# Patient Record
Sex: MALE | Race: BLACK OR AFRICAN AMERICAN | Employment: UNEMPLOYED | URBAN - METROPOLITAN AREA
[De-identification: names, ages, dates, MRNs, and addresses within clinical notes are randomized per-mention and may not be internally consistent; named-entity substitution may affect disease eponyms.]

---

## 2020-08-17 VITALS
SYSTOLIC BLOOD PRESSURE: 110 MMHG | BODY MASS INDEX: 24.99 KG/M2 | HEIGHT: 65 IN | WEIGHT: 150 LBS | DIASTOLIC BLOOD PRESSURE: 76 MMHG

## 2020-08-17 PROBLEM — R42 DIZZINESS: Status: ACTIVE | Noted: 2020-08-17

## 2020-08-17 RX ORDER — MECLIZINE HYDROCHLORIDE 25 MG/1
TABLET ORAL
COMMUNITY
End: 2021-04-05 | Stop reason: ALTCHOICE

## 2020-08-17 RX ORDER — ONDANSETRON 4 MG/1
4 TABLET, ORALLY DISINTEGRATING ORAL
COMMUNITY

## 2020-08-17 RX ORDER — CLOTRIMAZOLE 10 MG/1
10 LOZENGE ORAL; TOPICAL
COMMUNITY

## 2020-08-18 ENCOUNTER — OFFICE VISIT (OUTPATIENT)
Dept: PRIMARY CARE CLINIC | Age: 50
End: 2020-08-18
Payer: MEDICAID

## 2020-08-18 VITALS
BODY MASS INDEX: 25.44 KG/M2 | RESPIRATION RATE: 18 BRPM | HEART RATE: 70 BPM | OXYGEN SATURATION: 100 % | TEMPERATURE: 98.7 F | SYSTOLIC BLOOD PRESSURE: 116 MMHG | DIASTOLIC BLOOD PRESSURE: 68 MMHG | HEIGHT: 64 IN | WEIGHT: 149 LBS

## 2020-08-18 DIAGNOSIS — R42 DIZZINESS: Primary | ICD-10-CM

## 2020-08-18 PROCEDURE — 99213 OFFICE O/P EST LOW 20 MIN: CPT | Performed by: NURSE PRACTITIONER

## 2020-08-18 NOTE — LETTER
NOTIFICATION RETURN TO WORK / SCHOOL 
 
8/18/2020 11:52 AM 
 
Mr. Lloyd Barroso [address] [place] [id number] To Whom It May Concern: 
 
Lloyd Elkins is currently under the care of [address] [place] [address]. He will return to work/school on: 8/24/20 If there are questions or concerns please have the patient contact our office. Sincerely, Perla Davies NP

## 2020-08-20 ENCOUNTER — TELEPHONE (OUTPATIENT)
Dept: PRIMARY CARE CLINIC | Age: 50
End: 2020-08-20

## 2020-08-21 LAB
ALBUMIN SERPL-MCNC: 4.6 G/DL (ref 4–5)
ALBUMIN/GLOB SERPL: 1.8 {RATIO} (ref 1.2–2.2)
ALP SERPL-CCNC: 60 IU/L (ref 39–117)
ALT SERPL-CCNC: 17 IU/L (ref 0–44)
APPEARANCE UR: CLEAR
AST SERPL-CCNC: 17 IU/L (ref 0–40)
BASOPHILS # BLD AUTO: 0.1 X10E3/UL (ref 0–0.2)
BASOPHILS NFR BLD AUTO: 1 %
BILIRUB SERPL-MCNC: 0.3 MG/DL (ref 0–1.2)
BILIRUB UR QL STRIP: NEGATIVE
BUN SERPL-MCNC: 14 MG/DL (ref 6–24)
BUN/CREAT SERPL: 12 (ref 9–20)
CALCIUM SERPL-MCNC: 9.8 MG/DL (ref 8.7–10.2)
CHLORIDE SERPL-SCNC: 101 MMOL/L (ref 96–106)
CO2 SERPL-SCNC: 26 MMOL/L (ref 20–29)
COLOR UR: YELLOW
CREAT SERPL-MCNC: 1.19 MG/DL (ref 0.76–1.27)
EOSINOPHIL # BLD AUTO: 0.3 X10E3/UL (ref 0–0.4)
EOSINOPHIL NFR BLD AUTO: 6 %
ERYTHROCYTE [DISTWIDTH] IN BLOOD BY AUTOMATED COUNT: 12.7 % (ref 11.6–15.4)
GLOBULIN SER CALC-MCNC: 2.5 G/DL (ref 1.5–4.5)
GLUCOSE SERPL-MCNC: 94 MG/DL (ref 65–99)
GLUCOSE UR QL: NEGATIVE
HCT VFR BLD AUTO: 43.8 % (ref 37.5–51)
HGB BLD-MCNC: 13.8 G/DL (ref 13–17.7)
HGB UR QL STRIP: NEGATIVE
IMM GRANULOCYTES # BLD AUTO: 0 X10E3/UL (ref 0–0.1)
IMM GRANULOCYTES NFR BLD AUTO: 0 %
KETONES UR QL STRIP: NEGATIVE
LEUKOCYTE ESTERASE UR QL STRIP: NEGATIVE
LYMPHOCYTES # BLD AUTO: 2 X10E3/UL (ref 0.7–3.1)
LYMPHOCYTES NFR BLD AUTO: 40 %
MCH RBC QN AUTO: 28.6 PG (ref 26.6–33)
MCHC RBC AUTO-ENTMCNC: 31.5 G/DL (ref 31.5–35.7)
MCV RBC AUTO: 91 FL (ref 79–97)
MICRO URNS: NORMAL
MONOCYTES # BLD AUTO: 0.5 X10E3/UL (ref 0.1–0.9)
MONOCYTES NFR BLD AUTO: 10 %
NEUTROPHILS # BLD AUTO: 2.1 X10E3/UL (ref 1.4–7)
NEUTROPHILS NFR BLD AUTO: 43 %
NITRITE UR QL STRIP: NEGATIVE
PH UR STRIP: 5.5 [PH] (ref 5–7.5)
PLATELET # BLD AUTO: 287 X10E3/UL (ref 150–450)
POTASSIUM SERPL-SCNC: 4 MMOL/L (ref 3.5–5.2)
PROT SERPL-MCNC: 7.1 G/DL (ref 6–8.5)
PROT UR QL STRIP: NEGATIVE
RBC # BLD AUTO: 4.82 X10E6/UL (ref 4.14–5.8)
SODIUM SERPL-SCNC: 142 MMOL/L (ref 134–144)
SP GR UR: 1.03 (ref 1–1.03)
TSH SERPL DL<=0.005 MIU/L-ACNC: 0.61 UIU/ML (ref 0.45–4.5)
UROBILINOGEN UR STRIP-MCNC: 0.2 MG/DL (ref 0.2–1)
WBC # BLD AUTO: 5 X10E3/UL (ref 3.4–10.8)

## 2020-08-21 NOTE — PROGRESS NOTES
Please notify patient that all labs are essentially normal.  Please contact with any questions or concerns

## 2020-08-21 NOTE — PROGRESS NOTES
Lloyd Elkins is a 52 y.o. male who presents to the office today for the following:    Chief Complaint   Patient presents with    Dizziness       Past Medical History:   Diagnosis Date    Dizziness 8/17/2020       No past surgical history on file. Family History   Problem Relation Age of Onset    Heart Disease Mother         Social History     Tobacco Use    Smoking status: Former Smoker    Smokeless tobacco: Current User   Substance Use Topics    Alcohol use: Not on file    Drug use: Not on file        HPI  Patient here with c/o continued dizziness. States that he thought symptoms were better temporarily but now are daily again and affecting him at work. Was taking the meclizine and doing recommended head exercises which helped initially but now do not. Feels off balance with walking at times and especially if he is doing a lot of activity that requires him to move his head a lot. Was referred to ENT due to symptoms thought to be vertigo but states he did not go due to staff at referral office he thought were \"rude. \" Does feel nauseated some when symptoms occurring and sometimes has headache. Denies chest pain, palpitations, sob, numbness or weakness in extremities. Current Outpatient Medications on File Prior to Visit   Medication Sig    meclizine (ANTIVERT) 25 mg tablet Take  by mouth three (3) times daily as needed for Dizziness.  clotrimazole (MYCELEX) 10 mg jose cruz Take 10 mg by mouth five (5) times daily.  ondansetron (ZOFRAN ODT) 4 mg disintegrating tablet Take 4 mg by mouth every eight (8) hours as needed for Nausea or Vomiting. No current facility-administered medications on file prior to visit. Review of Systems   Constitutional: Negative. HENT: Negative. Eyes: Negative. Negative for blurred vision, double vision, photophobia, pain, discharge and redness. Respiratory: Negative.   Negative for cough, hemoptysis, sputum production, shortness of breath and wheezing. Cardiovascular: Negative. Negative for chest pain, palpitations, orthopnea, claudication, leg swelling and PND. Gastrointestinal: Negative. Negative for abdominal pain, blood in stool, constipation, diarrhea, heartburn, nausea and vomiting. Genitourinary: Negative. Negative for dysuria, flank pain, frequency, hematuria and urgency. Musculoskeletal: Negative. Negative for back pain, falls, joint pain, myalgias and neck pain. Skin: Negative. Neurological: Positive for dizziness and headaches. Negative for tingling, tremors, sensory change, speech change, focal weakness, seizures, loss of consciousness and weakness. Visit Vitals  /68 (BP 1 Location: Left arm, BP Patient Position: Sitting)   Pulse 70   Temp 98.7 °F (37.1 °C) (Tympanic)   Resp 18   Ht 5' 4\" (1.626 m)   Wt 149 lb (67.6 kg)   SpO2 100%   BMI 25.58 kg/m²       Physical Exam  Vitals signs and nursing note reviewed. Constitutional:       Appearance: Normal appearance. HENT:      Head: Normocephalic and atraumatic. Nose: Nose normal.      Mouth/Throat:      Mouth: Mucous membranes are moist.      Pharynx: Oropharynx is clear. Eyes:      Pupils: Pupils are equal, round, and reactive to light. Cardiovascular:      Rate and Rhythm: Normal rate and regular rhythm. Heart sounds: No murmur. Pulmonary:      Effort: Pulmonary effort is normal.      Breath sounds: Normal breath sounds. Abdominal:      General: Abdomen is flat. Bowel sounds are normal.      Palpations: Abdomen is soft. Musculoskeletal: Normal range of motion. Skin:     General: Skin is warm and dry. Neurological:      Mental Status: He is alert and oriented to person, place, and time. Mental status is at baseline. Cranial Nerves: No cranial nerve deficit. Sensory: No sensory deficit. Motor: No weakness.       Coordination: Coordination normal.      Gait: Gait normal.      Deep Tendon Reflexes: Reflexes normal. Psychiatric:         Mood and Affect: Mood normal.         Behavior: Behavior normal.         Judgment: Judgment normal.          1. Dizziness  Symptoms still appear more consistent with vertigo but has not attended appointment with ENT which I have encouraged him to do. Orthostatics have been normal when checked and no chest pain, palpitations etc reported. Will check labs and CT of head to rule out other causes given no improvement in symptoms and duration. May continue the meclizine prn  Avoid sudden changes in head movements (ie bending head down between knees, etc) and get up slowly  Discuss further recommendations pending results from further testing  - CBC WITH AUTOMATED DIFF  - METABOLIC PANEL, COMPREHENSIVE  - TSH 3RD GENERATION  - URINALYSIS W/ RFLX MICROSCOPIC  - CT HEAD WO CONT; Future      Patient verbalizes understanding of plan of care as discussed above    Follow-up and Dispositions    · Return if symptoms worsen or fail to improve.

## 2020-08-27 ENCOUNTER — DOCUMENTATION ONLY (OUTPATIENT)
Dept: PRIMARY CARE CLINIC | Age: 50
End: 2020-08-27

## 2020-08-27 NOTE — PROGRESS NOTES
Called pt to let him know that his insurance denied the CT scan but he does have an appt with dr Ynes Tee the end of this month to be seen for the vertigo

## 2020-09-14 ENCOUNTER — TELEPHONE (OUTPATIENT)
Dept: ENT CLINIC | Age: 50
End: 2020-09-14

## 2020-09-14 NOTE — TELEPHONE ENCOUNTER
I contacted all pt's after 3:00pm to see if they would switch they were not available to.  I left pt a vm that he needed to reschedule appt for tomorrow if he cannot make it at 2:00pm

## 2020-12-08 ENCOUNTER — TELEPHONE (OUTPATIENT)
Dept: ENT CLINIC | Age: 50
End: 2020-12-08

## 2020-12-09 ENCOUNTER — OFFICE VISIT (OUTPATIENT)
Dept: ENT CLINIC | Age: 50
End: 2020-12-09
Payer: MEDICAID

## 2020-12-09 VITALS
HEART RATE: 91 BPM | OXYGEN SATURATION: 99 % | BODY MASS INDEX: 25.44 KG/M2 | HEIGHT: 64 IN | WEIGHT: 149 LBS | DIASTOLIC BLOOD PRESSURE: 76 MMHG | SYSTOLIC BLOOD PRESSURE: 120 MMHG | TEMPERATURE: 96.9 F | RESPIRATION RATE: 18 BRPM

## 2020-12-09 DIAGNOSIS — J34.3 HYPERTROPHY OF BOTH INFERIOR NASAL TURBINATES: ICD-10-CM

## 2020-12-09 DIAGNOSIS — G47.30 SLEEP DISORDER BREATHING: ICD-10-CM

## 2020-12-09 DIAGNOSIS — R42 DIZZINESS: Primary | ICD-10-CM

## 2020-12-09 DIAGNOSIS — J31.0 CHRONIC RHINITIS: ICD-10-CM

## 2020-12-09 PROCEDURE — 99204 OFFICE O/P NEW MOD 45 MIN: CPT | Performed by: OTOLARYNGOLOGY

## 2020-12-09 RX ORDER — CETIRIZINE HCL 10 MG
10 TABLET ORAL DAILY
Qty: 30 TAB | Refills: 3 | Status: SHIPPED | OUTPATIENT
Start: 2020-12-09

## 2020-12-09 RX ORDER — FLUTICASONE PROPIONATE 50 MCG
2 SPRAY, SUSPENSION (ML) NASAL DAILY
Qty: 1 BOTTLE | Refills: 3 | Status: SHIPPED | OUTPATIENT
Start: 2020-12-09

## 2020-12-09 NOTE — LETTER
NOTIFICATION RETURN TO WORK / SCHOOL 
 
12/9/2020 10:49 AM 
 
[facility] [address] [place], [place] [id number] To Whom It May Concern: 
 
Shreya Bond is currently under the care of [facility]. He was seen in our office on: 12/09/2020 He will return to work/school on:12/10/2020 If there are questions or concerns please have the patient contact our office. Sincerely, Grace Alexander MD

## 2020-12-09 NOTE — LETTER
12/9/20 Patient: Jim Rios YOB: 1970 Date of Visit: 12/9/2020 Kailey Wetzel NP 
[address] [facility],[address] [address] VIA In Basket Dear Kailey Wetzel NP, Thank you for referring Mr. Melida Forrester to [facility] for evaluation. My notes for this consultation are attached. If you have questions, please do not hesitate to call me. I look forward to following your patient along with you. Sincerely, Diaz Cristobal MD

## 2020-12-09 NOTE — PROGRESS NOTES
Subjective:    Esteban Medel   48 y.o.   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 HENT: Positive for congestion. Negative for ear pain, hearing loss, nosebleeds and tinnitus. Eyes: Negative for blurred vision and double vision. Respiratory: Negative for cough, sputum production and shortness of breath. Cardiovascular: Negative for chest pain and palpitations. Gastrointestinal: Negative for heartburn, nausea and vomiting. Musculoskeletal: Negative for joint pain and neck pain. Skin: Negative. Neurological: Positive for dizziness. Negative for speech change, weakness and headaches. Endo/Heme/Allergies: Negative for environmental allergies. Does not bruise/bleed easily. Psychiatric/Behavioral: Negative for memory loss. The patient does not have insomnia. Past Medical History:   Diagnosis Date    Dizziness 8/17/2020     History reviewed. No pertinent surgical history. Family History   Problem Relation Age of Onset    Heart Disease Mother      Social History     Tobacco Use    Smoking status: Former Smoker    Smokeless tobacco: Current User   Substance Use Topics    Alcohol use: Not on file      Prior to Admission medications    Medication Sig Start Date End Date Taking? Authorizing Provider   cetirizine (ZYRTEC) 10 mg tablet Take 1 Tab by mouth daily. 12/9/20  Yes Uriel Kovacs MD   fluticasone propionate (FLONASE) 50 mcg/actuation nasal spray 2 Sprays by Both Nostrils route daily. 12/9/20  Yes Paola Ambrosio MD   clotrimazole (MYCELEX) 10 mg jose cruz Take 10 mg by mouth five (5) times daily. Provider, Historical   meclizine (ANTIVERT) 25 mg tablet Take  by mouth three (3) times daily as needed for Dizziness. Provider, Historical   ondansetron (ZOFRAN ODT) 4 mg disintegrating tablet Take 4 mg by mouth every eight (8) hours as needed for Nausea or Vomiting. Provider, Historical        No Known Allergies      Objective:     Visit Vitals  /76 (BP 1 Location: Left arm, BP Patient Position: Sitting)   Pulse 91   Temp 96.9 °F (36.1 °C) (Oral)   Resp 18   Ht 5' 4\" (1.626 m)   Wt 149 lb (67.6 kg)   SpO2 99%   BMI 25.58 kg/m²        Physical Exam  Vitals signs reviewed. Constitutional:       General: He is awake. Appearance: Normal appearance. He is normal weight. HENT:      Head: Normocephalic and atraumatic. Jaw: There is normal jaw occlusion. No trismus, tenderness or malocclusion. Salivary Glands: Right salivary gland is not diffusely enlarged or tender. Left salivary gland is not diffusely enlarged or tender. Right Ear: Hearing, tympanic membrane, ear canal and external ear normal.      Left Ear: Hearing, tympanic membrane, ear canal and external ear normal.      Ears:      Andrew exam findings: does not lateralize. Right Rinne: AC > BC. Left Rinne: AC > BC. Nose: Mucosal edema present. No septal deviation or rhinorrhea. Right Turbinates: Enlarged. Not swollen or pale. Left Turbinates: Enlarged. Not swollen or pale. Right Sinus: No maxillary sinus tenderness or frontal sinus tenderness. Left Sinus: No maxillary sinus tenderness or frontal sinus tenderness. Mouth/Throat:      Lips: Pink. Mouth: Mucous membranes are moist. No oral lesions. Dentition: Normal dentition. No gum lesions. Tongue: No lesions. Palate: No mass and lesions. Pharynx: Oropharynx is clear. Uvula midline. Tonsils: No tonsillar exudate. 2+ on the right. 2+ on the left. Eyes:      General: Vision grossly intact. Extraocular Movements: Extraocular movements intact. Right eye: No nystagmus. Left eye: No nystagmus. Pupils: Pupils are equal, round, and reactive to light. Neck:      Musculoskeletal: Normal range of motion. No edema or erythema. Thyroid: No thyroid mass, thyromegaly or thyroid tenderness. Trachea: Trachea and phonation normal. No tracheal tenderness. Cardiovascular:      Rate and Rhythm: Normal rate and regular rhythm. Pulmonary:      Effort: Pulmonary effort is normal.      Breath sounds: Normal breath sounds. No stridor. No wheezing. Musculoskeletal: Normal range of motion. Lymphadenopathy:      Cervical: No cervical adenopathy. Skin:     General: Skin is warm and dry. Neurological:      General: No focal deficit present. Mental Status: He is alert and oriented to person, place, and time. Mental status is at baseline. Cranial Nerves: Cranial nerves are intact. Coordination: Romberg sign negative. Gait: Gait is intact. Comments: Negative Hallpike   Psychiatric:         Mood and Affect: Mood normal.         Behavior: Behavior normal. Behavior is cooperative. Assessment/Plan:     Encounter Diagnoses   Name Primary?  Dizziness Yes    Chronic rhinitis     Hypertrophy of both inferior nasal turbinates     Sleep disorder breathing      He does not have sx suggestive of a vestibular disorder  He does have findings c/w possible SUKH and allergies.   Will get sleep study referral and start daily antihistamine and flonase  Fu 3 mos    Orders Placed This Encounter   [address] [place]    cetirizine (ZYRTEC) 10 mg tablet    fluticasone propionate (FLONASE) 50 mcg/actuation nasal spray     Follow-up and Dispositions    · Return in about 3 months (around 3/9/2021).

## 2021-01-20 ENCOUNTER — TELEPHONE (OUTPATIENT)
Dept: ENT CLINIC | Age: 51
End: 2021-01-20

## 2021-04-05 ENCOUNTER — OFFICE VISIT (OUTPATIENT)
Dept: PRIMARY CARE CLINIC | Age: 51
End: 2021-04-05
Payer: MEDICAID

## 2021-04-05 VITALS
SYSTOLIC BLOOD PRESSURE: 108 MMHG | HEIGHT: 64 IN | HEART RATE: 86 BPM | BODY MASS INDEX: 25.12 KG/M2 | WEIGHT: 147.13 LBS | RESPIRATION RATE: 18 BRPM | DIASTOLIC BLOOD PRESSURE: 70 MMHG | TEMPERATURE: 97.9 F | OXYGEN SATURATION: 100 %

## 2021-04-05 DIAGNOSIS — M79.609 PAIN DUE TO ONYCHOMYCOSIS OF NAIL: ICD-10-CM

## 2021-04-05 DIAGNOSIS — B35.1 PAIN DUE TO ONYCHOMYCOSIS OF NAIL: ICD-10-CM

## 2021-04-05 DIAGNOSIS — Z12.11 SCREENING FOR MALIGNANT NEOPLASM OF COLON: Primary | ICD-10-CM

## 2021-04-05 DIAGNOSIS — F17.200 TOBACCO DEPENDENCE: ICD-10-CM

## 2021-04-05 DIAGNOSIS — R53.83 FATIGUE, UNSPECIFIED TYPE: ICD-10-CM

## 2021-04-05 DIAGNOSIS — R06.83 HABITUAL SNORING: ICD-10-CM

## 2021-04-05 PROCEDURE — 99214 OFFICE O/P EST MOD 30 MIN: CPT | Performed by: NURSE PRACTITIONER

## 2021-04-05 NOTE — PROGRESS NOTES
Saqib Frias is a 48 y.o. male who presents to the office today for the following:    Chief Complaint   Patient presents with    Sleep Problem     Patient states he has a problem just falling asleep or sleeping so hard that he doesn't hear his phone or alarm. He is \"just exhausted\"    Referral Follow Up     States he needs a F/U referral to see Dr. Damian Suero to have a sleep study done.  Nail Problem       Past Medical History:   Diagnosis Date    Dizziness 8/17/2020    Tobacco dependence 4/5/2021       History reviewed. No pertinent surgical history. Family History   Problem Relation Age of Onset    Heart Disease Mother         Social History     Tobacco Use    Smoking status: Current Every Day Smoker     Packs/day: 0.75     Years: 1.00     Pack years: 0.75    Smokeless tobacco: Never Used   Substance Use Topics    Alcohol use: Not Currently    Drug use: Yes     Types: Marijuana     Comment: Uses this occassionally. HPI  Patient here with PMH of seasonal allergies, vertigo and tobacco dependence who presents with complaints of feeling tired \"all the time. \" States that he recently lost his job due to being late multiple times resulting from oversleeping. Reports that this has been worse since November 2020. Does state that during that time he was being required to rotate shifts which he thinks was a big cause of the problem. He has been out of work however for past few weeks and still feels that this is problematic despite stopping the rotating shifts. Told by the ENT who he was seeing due to vertigo and allergies that he should get a sleep study. Thinks they put a referral but he did not hear from anyone. Has been told in past by others that he snores loudly and has woken himself up snoring before. Also here due to he wants to see a podiatrist about nails as some are discolored and the nails on both of his small toes stay sore.      Current Outpatient Medications on File Prior to Visit Medication Sig    cetirizine (ZYRTEC) 10 mg tablet Take 1 Tab by mouth daily.  fluticasone propionate (FLONASE) 50 mcg/actuation nasal spray 2 Sprays by Both Nostrils route daily.  clotrimazole (MYCELEX) 10 mg jose cruz Take 10 mg by mouth five (5) times daily.  ondansetron (ZOFRAN ODT) 4 mg disintegrating tablet Take 4 mg by mouth every eight (8) hours as needed for Nausea or Vomiting.  [DISCONTINUED] meclizine (ANTIVERT) 25 mg tablet Take  by mouth three (3) times daily as needed for Dizziness. No current facility-administered medications on file prior to visit. No orders of the defined types were placed in this encounter. Review of Systems   Constitutional: Positive for malaise/fatigue. Negative for chills, diaphoresis, fever and weight loss. HENT: Negative for congestion, ear discharge, hearing loss, nosebleeds, sinus pain, sore throat and tinnitus. Snoring   Eyes: Negative. Negative for blurred vision, double vision, photophobia, pain, discharge and redness. Respiratory: Negative. Negative for cough, hemoptysis, sputum production, shortness of breath, wheezing and stridor. Cardiovascular: Negative. Negative for chest pain, palpitations, orthopnea, claudication, leg swelling and PND. Gastrointestinal: Negative. Negative for abdominal pain, blood in stool, constipation, diarrhea, heartburn, nausea and vomiting. Genitourinary: Negative. Negative for dysuria, flank pain, frequency, hematuria and urgency. Musculoskeletal: Negative. Negative for back pain, falls, joint pain, myalgias and neck pain. Skin: Negative for itching and rash. Nails discolored bilaterally   Neurological: Negative for dizziness, tingling, tremors, sensory change, speech change, focal weakness, seizures, loss of consciousness, weakness and headaches. Psychiatric/Behavioral: Negative.             Visit Vitals  /70 (BP 1 Location: Right upper arm, BP Patient Position: Sitting, BP Cuff Size: Adult)   Pulse 86   Temp 97.9 °F (36.6 °C) (Skin)   Resp 18   Ht 5' 4\" (1.626 m)   Wt 147 lb 2 oz (66.7 kg)   SpO2 100%   BMI 25.25 kg/m²       Physical Exam  Vitals signs and nursing note reviewed. Constitutional:       Appearance: Normal appearance. HENT:      Head: Normocephalic and atraumatic. Right Ear: Tympanic membrane normal.      Left Ear: Tympanic membrane normal.      Nose: No congestion. Mouth/Throat:      Mouth: Mucous membranes are moist.      Pharynx: Oropharynx is clear. Eyes:      Pupils: Pupils are equal, round, and reactive to light. Neck:      Vascular: No carotid bruit. Cardiovascular:      Rate and Rhythm: Normal rate and regular rhythm. Pulses: Normal pulses. Dorsalis pedis pulses are 2+ on the right side and 2+ on the left side. Posterior tibial pulses are 2+ on the right side and 2+ on the left side. Heart sounds: Normal heart sounds. Pulmonary:      Effort: Pulmonary effort is normal.      Breath sounds: Normal breath sounds. Abdominal:      General: Bowel sounds are normal.      Palpations: Abdomen is soft. Tenderness: There is no abdominal tenderness. Musculoskeletal: Normal range of motion. Right lower leg: No edema. Left lower leg: No edema. Right foot: Normal range of motion. Left foot: Normal range of motion. Feet:      Right foot:      Skin integrity: Dry skin present. No ulcer, blister or warmth. Toenail Condition: Right toenails are abnormally thick. Fungal disease present. Left foot:      Skin integrity: Dry skin present. No ulcer, blister or warmth. Toenail Condition: Left toenails are abnormally thick. Fungal disease present. Lymphadenopathy:      Cervical: No cervical adenopathy. Skin:     General: Skin is warm and dry. Neurological:      Mental Status: He is alert and oriented to person, place, and time. Mental status is at baseline.           1. Habitual snoring  Reports daytime fatigue, habitual snoring and gasping during sleep. Check home sleep study and follow up pending results  - SLEEP MEDICINE REFERRAL    2. Fatigue, unspecified type  Checking sleep study  Had labs done that including TSH in 8/2020 which were normal  He declines repeating even with change since labs    3. Screening for malignant neoplasm of colon  Recently turned age 48 and discussed recommended colonoscopy which he is agreeable to  - COLONOSCOPY; Future  - REFERRAL TO GASTROENTEROLOGY    4. Pain due to onychomycosis of nail  Refer to podiatry for further eval and treatment  - REFERRAL TO PODIATRY    5. Tobacco dependence  Continue to encourage smoking cessation      Patient verbalizes understanding of plan of care as discussed above    Follow-up and Dispositions    · Return in about 4 weeks (around 5/3/2021) for or sooner for worsening symptoms.

## 2021-04-05 NOTE — PROGRESS NOTES
Chief Complaint   Patient presents with    Sleep Problem     Patient states he has a problem just falling asleep or sleeping so hard that he doesn't hear his phone or alarm. He is \"just exhausted\"    Referral Follow Up     States he needs a F/U referral to see Dr. Fermin Paz to have a sleep study done. 1. Have you been to the ER, urgent care clinic since your last visit? Hospitalized since your last visit? No    2. Have you seen or consulted any other health care providers outside of the [address] [place] [address] since your last visit? Include any pap smears or colon screening.  No

## 2021-04-07 ENCOUNTER — TELEPHONE (OUTPATIENT)
Dept: GASTROENTEROLOGY | Age: 51
End: 2021-04-07

## 2021-04-07 RX ORDER — POLYETHYLENE GLYCOL 3350 17 G/17G
POWDER, FOR SOLUTION ORAL
Qty: 510 G | Refills: 0 | Status: SHIPPED | OUTPATIENT
Start: 2021-04-07

## 2021-04-07 NOTE — TELEPHONE ENCOUNTER
I called patient, we discussed scheduling the colonoscopy screening. We reviewed the questionair, he is ok to schedule. He chose 4-. Orders and instructions mailed to patient. Silas Baird notified. No PA required per Margaret Cruz CC-Ref # [id number].

## 2021-04-08 ENCOUNTER — TELEPHONE (OUTPATIENT)
Dept: GASTROENTEROLOGY | Age: 51
End: 2021-04-08

## 2021-04-08 NOTE — LETTER
2021 9:02 AM 
 
[facility] [address] [place] [id number] Dear Ming Arredondo NP Thank you for referring your patient Sergo Guerrier , : 1970, to us for colonoscopy screening. In contacting the patient, he has been scheduled for 2021. Sincerely, Riley Mancia MD

## 2021-04-19 ENCOUNTER — HOSPITAL ENCOUNTER (OUTPATIENT)
Dept: PREADMISSION TESTING | Age: 51
Discharge: HOME | End: 2021-04-19
Payer: MEDICAID

## 2021-04-19 LAB — SARS-COV-2, COV2: NORMAL

## 2021-04-19 PROCEDURE — U0005 INFEC AGEN DETEC AMPLI PROBE: HCPCS

## 2021-04-20 LAB — SARS-COV-2, COV2NT: NOT DETECTED

## 2021-04-22 ENCOUNTER — TELEPHONE (OUTPATIENT)
Dept: GASTROENTEROLOGY | Age: 51
End: 2021-04-22

## 2021-07-12 ENCOUNTER — TELEPHONE (OUTPATIENT)
Dept: PRIMARY CARE CLINIC | Age: 51
End: 2021-07-12

## 2021-07-12 NOTE — TELEPHONE ENCOUNTER
Patient was calling and said the place where he did his sleep study were sending the results to Brock Turcios. He was calling to get the results. Patient would like a call back.

## 2021-07-29 ENCOUNTER — OFFICE VISIT (OUTPATIENT)
Dept: PRIMARY CARE CLINIC | Age: 51
End: 2021-07-29
Payer: MEDICAID

## 2021-07-29 VITALS
OXYGEN SATURATION: 99 % | DIASTOLIC BLOOD PRESSURE: 71 MMHG | WEIGHT: 150.6 LBS | SYSTOLIC BLOOD PRESSURE: 132 MMHG | RESPIRATION RATE: 18 BRPM | BODY MASS INDEX: 25.85 KG/M2 | HEART RATE: 108 BPM | TEMPERATURE: 98.8 F

## 2021-07-29 DIAGNOSIS — R10.32 LLQ ABDOMINAL PAIN: Primary | ICD-10-CM

## 2021-07-29 PROCEDURE — 99213 OFFICE O/P EST LOW 20 MIN: CPT | Performed by: NURSE PRACTITIONER

## 2021-07-29 NOTE — PROGRESS NOTES
Kenia Russell is a 48 y.o. male who presents to the office today for the following:    Chief Complaint   Patient presents with    Abdominal Pain       Past Medical History:   Diagnosis Date    Dizziness 8/17/2020    Tobacco dependence 4/5/2021       History reviewed. No pertinent surgical history. Family History   Problem Relation Age of Onset    Heart Disease Mother         Social History     Tobacco Use    Smoking status: Current Every Day Smoker     Packs/day: 0.75     Years: 1.00     Pack years: 0.75    Smokeless tobacco: Never Used   Vaping Use    Vaping Use: Former   Substance Use Topics    Alcohol use: Not Currently    Drug use: Yes     Types: Marijuana     Comment: Uses this occassionally. HPI  Patient here today with PMH of tobacco dependence, Vertigo and chronic rhinitis who complains of having LLQ abdominal pain 6 days ago and then again 2 days ago. States that his diet has been poor recently and eating more frozen meals. Today has not had any pain. Reports normal bowel movements with no diarrhea or constipation. Did have some nausea the first time pain occurred but none since then. Denies fever or vomiting. Otherwise has been feeling well. Current Outpatient Medications on File Prior to Visit   Medication Sig    polyethylene glycol (MIRALAX) 17 gram/dose powder Use as instructed by physician for colonoscopy prep (Patient not taking: Reported on 7/29/2021)    cetirizine (ZYRTEC) 10 mg tablet Take 1 Tab by mouth daily. (Patient not taking: Reported on 7/29/2021)    fluticasone propionate (FLONASE) 50 mcg/actuation nasal spray 2 Sprays by Both Nostrils route daily. (Patient not taking: Reported on 7/29/2021)    clotrimazole (MYCELEX) 10 mg jose cruz Take 10 mg by mouth five (5) times daily. (Patient not taking: Reported on 7/29/2021)    ondansetron (ZOFRAN ODT) 4 mg disintegrating tablet Take 4 mg by mouth every eight (8) hours as needed for Nausea or Vomiting.  (Patient not taking: Reported on 7/29/2021)     No current facility-administered medications on file prior to visit. No orders of the defined types were placed in this encounter. Review of Systems   Constitutional: Negative for chills, diaphoresis, fever, malaise/fatigue and weight loss. Respiratory: Negative. Cardiovascular: Negative. Gastrointestinal: Positive for abdominal pain and nausea. Negative for blood in stool, constipation, diarrhea, heartburn and vomiting. Genitourinary: Negative. Musculoskeletal: Negative. Neurological: Negative. Visit Vitals  /71 (BP 1 Location: Left upper arm, BP Patient Position: Sitting, BP Cuff Size: Adult)   Pulse (!) 108   Temp 98.8 °F (37.1 °C) (Oral)   Resp 18   Wt 150 lb 9.6 oz (68.3 kg)   SpO2 99%   BMI 25.85 kg/m²       Physical Exam  Vitals and nursing note reviewed. Constitutional:       Appearance: Normal appearance. Cardiovascular:      Rate and Rhythm: Normal rate and regular rhythm. Pulses: Normal pulses. Heart sounds: Normal heart sounds. Pulmonary:      Effort: Pulmonary effort is normal.      Breath sounds: Normal breath sounds. Abdominal:      General: Bowel sounds are normal.      Palpations: Abdomen is soft. Tenderness: There is no abdominal tenderness. There is no guarding or rebound. Hernia: No hernia is present. Musculoskeletal:         General: Normal range of motion. Right lower leg: No edema. Left lower leg: No edema. Skin:     General: Skin is warm and dry. Neurological:      Mental Status: He is alert and oriented to person, place, and time. Mental status is at baseline. 1. LLQ abdominal pain      His pain is resolved today and no tenderness on exam today  Given his symptoms improved, do not feel more testing necessary and he will monitor to let us know if continuing to reoccur or any worsening symptoms.   Did discuss dietary changes which may be beneficial  He canceled appointment with Dr. Renae Morris for colonoscopy and encouraged him to re-schedule  Work note provided     Patient verbalizes understanding of plan of care as discussed above    Follow-up and Dispositions    · Return if symptoms worsen or fail to improve.

## 2021-07-29 NOTE — LETTER
NOTIFICATION RETURN TO WORK / SCHOOL    7/29/2021 8:46 AM    Mr. Delores Haro  [address] [address]      To Whom It May Concern:    Delores Haro is currently under the care of [facility]. He was unable to attend work from 7/24/21-7/29/2. He will return to work/school on:7/30/21          If there are questions or concerns please have the patient contact our office.         Sincerely,      Codi Lawrence NP

## 2021-07-29 NOTE — PROGRESS NOTES
Chief Complaint   Patient presents with    Abdominal Pain     Off and on since Saturday, bowel movements sometimes loose and sometimes hard

## 2022-03-18 PROBLEM — F17.200 TOBACCO DEPENDENCE: Status: ACTIVE | Noted: 2021-04-05

## 2022-03-19 PROBLEM — G47.30 SLEEP DISORDER BREATHING: Status: ACTIVE | Noted: 2020-12-09

## 2022-03-19 PROBLEM — J31.0 CHRONIC RHINITIS: Status: ACTIVE | Noted: 2020-12-09

## 2022-03-19 PROBLEM — R06.83 HABITUAL SNORING: Status: ACTIVE | Noted: 2021-04-05

## 2022-03-19 PROBLEM — J34.3 HYPERTROPHY OF BOTH INFERIOR NASAL TURBINATES: Status: ACTIVE | Noted: 2020-12-09

## 2022-03-19 PROBLEM — R42 DIZZINESS: Status: ACTIVE | Noted: 2020-08-17

## 2022-03-20 PROBLEM — R53.83 FATIGUE: Status: ACTIVE | Noted: 2021-04-05

## 2023-05-18 RX ORDER — CETIRIZINE HYDROCHLORIDE 10 MG/1
10 TABLET ORAL DAILY
COMMUNITY
Start: 2020-12-09

## 2023-05-18 RX ORDER — ONDANSETRON 4 MG/1
4 TABLET, ORALLY DISINTEGRATING ORAL EVERY 8 HOURS PRN
COMMUNITY

## 2023-05-18 RX ORDER — CLOTRIMAZOLE 10 MG/1
10 LOZENGE ORAL; TOPICAL
COMMUNITY

## 2023-05-18 RX ORDER — POLYETHYLENE GLYCOL 3350 17 G/17G
POWDER, FOR SOLUTION ORAL
Status: ON HOLD | COMMUNITY
Start: 2021-04-07 | End: 2023-06-14 | Stop reason: HOSPADM

## 2023-05-18 RX ORDER — FLUTICASONE PROPIONATE 50 MCG
2 SPRAY, SUSPENSION (ML) NASAL DAILY
COMMUNITY
Start: 2020-12-09

## 2023-05-24 ENCOUNTER — TELEPHONE (OUTPATIENT)
Age: 53
End: 2023-05-24

## 2023-05-24 DIAGNOSIS — Z12.11 ENCOUNTER FOR SCREENING FOR MALIGNANT NEOPLASM OF COLON: Primary | ICD-10-CM

## 2023-06-04 RX ORDER — POLYETHYLENE GLYCOL 3350 17 G/17G
POWDER, FOR SOLUTION ORAL
Qty: 510 G | Refills: 0 | Status: SHIPPED | OUTPATIENT
Start: 2023-06-04

## 2024-08-15 ENCOUNTER — HOSPITAL ENCOUNTER (OUTPATIENT)
Facility: HOSPITAL | Age: 54
Setting detail: RECURRING SERIES
Discharge: HOME | End: 2024-08-18
Payer: MEDICAID

## 2024-08-15 PROCEDURE — 97161 PT EVAL LOW COMPLEX 20 MIN: CPT

## 2024-08-15 NOTE — THERAPY EVALUATION
within 5 treatments:  Patient will demonstrate independence and compliance with HEP in order to increase mobility and assist with carryover from PT services.  Status at last Eval/Progress Note: none provided  Current Status: see above, Initial Evaluation completed today  Goal Met?  No    2.  Patient may be able to stand for at least 5 minutes for daily prayers.  Status at last Eval/Progress Note: sit immediately or bend over in flexed position to ease pain  Current Status: see above, Initial Evaluation completed today  Goal Met?  No    3. Patient will be able to perform household chores at least 10 minutes without sitting.   Status at last Eval/Progress Note: household chores are difficulty to perform, must sit every few minutes  Current Status: see above, Initial Evaluation completed today  Goal Met?  No      Long Term Goals, to be met within 10 treatments:  Patient will be able to stand as long as he likes for daily prayers.   Status at last Eval/Progress Note: sit immediately or bend over in flexed position to ease pain  Current Status: see above, Initial Evaluation completed today  Goal Met?  No    2.  Patient will be able to sleep in prone position for at at least with 2 hrs. with pain level of 6/10  Status at last Eval/Progress Note: not able to sleep on stomach due to pain   Current Status: see above, Initial Evaluation completed today  Goal Met?  No    3. Patient will be able stand and perform lumbar extension of 15 degrees with pain level of 6/10  Status at last Eval/Progress Note: lumbar extension of 10 degrees with pain  Current Status: see above, Initial Evaluation completed today  Goal Met?  No      Frequency / Duration: Patient to be seen 2 times per week for 10 treatments    Patient/ Caregiver education and instruction: Diagnosis, prognosis, exercises   [x]  Plan of care has been reviewed with PTA    Certification period: 8/15/2024 to 10/13/24        Albertina Queen, PT,        8/15/2024       9:33

## 2024-08-21 ENCOUNTER — HOSPITAL ENCOUNTER (OUTPATIENT)
Facility: HOSPITAL | Age: 54
Setting detail: RECURRING SERIES
Discharge: HOME | End: 2024-08-24
Payer: MEDICAID

## 2024-08-21 PROCEDURE — 97110 THERAPEUTIC EXERCISES: CPT

## 2024-08-21 PROCEDURE — G0283 ELEC STIM OTHER THAN WOUND: HCPCS

## 2024-08-21 NOTE — PROGRESS NOTES
PHYSICAL THERAPY - DAILY TREATMENT NOTE (updated 3/23)    Date of Service: 2024        Patient Name:  Bassam Washington :  1970   Medical   Diagnosis:  Low back pain [M54.50] Treatment Diagnosis:  M54.59  OTHER LOWER BACK PAIN    Referral Source:  Barbara Christensen PA-C Insurance:   Payor: [employer] [facility] PL / Plan: EXP [place] [employer] [facility] MED 4.0 [place] / Product Type: *No Product type* /     [x] Patient's date of birth verified                Visit #   Current  / Total 2 10   Time   In / Out 0900 0958   Total Treatment Time (in minutes) 58    Total Timed Codes  (in minutes) 48    [facility] Totals Reminder:    8-22 min = 1 unit; 23-37 min = 2 units; 38-52 min = 3 units;  53-67 min = 4 units; 68-82 min = 5 units      SUBJECTIVE  Pain Level (0-10 scale): 4/10    Any medication changes, allergies to medications, adverse drug reactions, diagnosis change, or new procedure performed?: No  Medications: [x]  Verified on Patient Summary List    Subjective functional status/changes:     \"I am stiff this morning.\"    OBJECTIVE  Therapeutic Procedures:  Tx Min Billable or 1:1 Min (if diff from Tx Min) Procedure, Rationale, Specifics   48  [id number] Therapeutic Exercise (timed):  increase ROM, strength, coordination, balance, and proprioception to improve patient's ability to progress to PLOF and address remaining functional goals. (see flow sheet as applicable)   48     Total Total   [x] Patient Education billed concurrently with other procedures    Modalities Rationale:     decrease edema, decrease inflammation, and decrease pain to improve patient's ability to progress to PLOF and address remaining functional goals.    10   min [x] Estim Unattended,             []  NMES  [] PreMod       [x]  IFC  [] Other:  []w/US   []w/ice   [x]w/heat  Position: seated  Location: lower back            min []  Mechanical Traction,        []  Cervical      []  Lumbar        []  Prone         []  Supine           []

## 2024-08-23 ENCOUNTER — HOSPITAL ENCOUNTER (OUTPATIENT)
Facility: HOSPITAL | Age: 54
Setting detail: RECURRING SERIES
Discharge: HOME | End: 2024-08-26
Payer: MEDICAID

## 2024-08-23 PROCEDURE — G0283 ELEC STIM OTHER THAN WOUND: HCPCS

## 2024-08-23 PROCEDURE — 97110 THERAPEUTIC EXERCISES: CPT

## 2024-08-23 NOTE — PROGRESS NOTES
PHYSICAL THERAPY - DAILY TREATMENT NOTE (updated 3/23)    Date of Service: 2024        Patient Name:  Bassam Washington :  1970   Medical   Diagnosis:  Low back pain [M54.50] Treatment Diagnosis:  M54.59, G89.29  CHRONIC LOWER BACK PAIN    Referral Source:  Barbara Christensen PA-C Insurance:   Payor: [facility] PL / Plan: EXP [place] [employer] [facility] MED 4.0 [place] / Product Type: *No Product type* /     [x] Patient's date of birth verified                Visit #   Current  / Total 3 10   Time   In / Out 0856 0956   Total Treatment Time (in minutes) 60    Total Timed Codes  (in minutes) 50    [facility] Totals Reminder:    8-22 min = 1 unit; 23-37 min = 2 units; 38-52 min = 3 units;  53-67 min = 4 units; 68-82 min = 5 units    SUBJECTIVE  Pain Level (0-10 scale): 0/10    Any medication changes, allergies to medications, adverse drug reactions, diagnosis change, or new procedure performed?: No  Medications: [x]  Verified on Patient Summary List    Subjective functional status/changes:     \"I haven't done much this morning so I'm not hurting.\"    OBJECTIVE  Therapeutic Procedures:  Tx Min Billable or 1:1 Min (if diff from Tx Min) Procedure, Rationale, Specifics   50  [id number] Therapeutic Exercise (timed):  increase ROM, strength, coordination, balance, and proprioception to improve patient's ability to progress to PLOF and address remaining functional goals. (see flow sheet as applicable)   50     Total Total   [x] Patient Education billed concurrently with other procedures    Modalities Rationale:     decrease pain and increase tissue extensibility to improve patient's ability to progress to PLOF and address remaining functional goals.    10   min [x] Estim Unattended,             []  NMES  [] PreMod       [x]  IFC  [] Other:  []w/US   []w/ice   [x]w/heat  Position: seated  Location: low back            min []  Mechanical Traction,        []  Cervical      []  Lumbar        []  Prone         []      30\"x3                    Bridging            x20     x20                     Pelvic tilt                                Camel and cow         x10    x10                     LTR         X20 B     x20                   Piriformis stretch   Push  30\"x3 B           [x] Eval only for Running Visit #1, no treatment provided     Modalities to be included future treatment sessions: Vasopneumatic Compression, Electrical Stimulation, Heat Pack, Cold Pack, Lumbar Traction, and Ultrasound  Has HEP been provided to patient? [] No    [x] Yes                                      Access Code:  [email or web address]                   Date Provided: 08/21/2024  [] Reviewed HEP    [] Progressed/Changed HEP, details:         GOALS  Short Term Goals, to be met within 5 treatments:  Patient will demonstrate independence and compliance with HEP in order to increase mobility and assist with carryover from PT services.  Status at last Eval/Progress Note: none provided  Current Status: provided 08/21/2024  Goal Met?  No     2.  Patient may be able to stand for at least 5 minutes for daily prayers.  Status at last Eval/Progress Note: sit immediately or bend over in flexed position to ease pain  Current Status: does not stand long  Goal Met?  No     3. Patient will be able to perform household chores at least 10 minutes without sitting.   Status at last Eval/Progress Note: household chores are difficulty to perform, must sit every few minutes  Current Status: sits often when trying to perform  Goal Met?  No        Long Term Goals, to be met within 10 treatments:  Patient will be able to stand as long as he likes for daily prayers.   Status at last Eval/Progress Note: sit immediately or bend over in flexed position to ease pain  Current Status: cannot stand fully  Goal Met?  No     2.  Patient will be able to sleep in prone position for at at least with 2 hrs. with pain level of 6/10  Status at last Eval/Progress Note: not able to sleep on stomach due to

## 2024-08-30 ENCOUNTER — HOSPITAL ENCOUNTER (OUTPATIENT)
Facility: HOSPITAL | Age: 54
Setting detail: RECURRING SERIES
End: 2024-08-30
Payer: MEDICAID

## 2024-08-30 PROCEDURE — 97110 THERAPEUTIC EXERCISES: CPT

## 2024-08-30 PROCEDURE — G0283 ELEC STIM OTHER THAN WOUND: HCPCS

## 2024-08-30 NOTE — PROGRESS NOTES
PHYSICAL THERAPY - DAILY TREATMENT NOTE (updated 3/23)    Date of Service: 2024        Patient Name:  Bassam Washington :  1970   Medical   Diagnosis:  Low back pain [M54.50] Treatment Diagnosis:  M54.59  OTHER LOWER BACK PAIN and M54.59, G89.29  CHRONIC LOWER BACK PAIN    Referral Source:  Barbara Christensen PA-C Insurance:   Payor: [facility] PL / Plan: EXP [facility] MED 4.0 [place] / Product Type: *No Product type* /    [x] Patient's date of birth verified                      Visit #   Current  / Total 4 10   Time   In / Out 900 am 1006 am   Total Treatment Time (in minutes) 66    Total Timed Codes (in minutes) 51       [facility] Totals Reminder:  bill using total billable   min of TIMED therapeutic procedures and modalities.   8-22 min = 1 unit; 23-37 min = 2 units; 38-52 min = 3 units; 53-67 min = 4 units; 68-82 min = 5 units        SUBJECTIVE  Pain Level (0-10 scale): 0/10    Any medication changes, allergies to medications, adverse drug reactions, diagnosis change, or new procedure performed?: No  Medications: [x] Verified on Patient Summary List    Subjective functional status/changes:     \"Pt reports no pain upon arrival however he has not been moving around much yet this morning.\"    OBJECTIVE  Therapeutic Procedures:  Tx Min Billable or 1:1 Min (if diff from Tx Min) Procedure, Rationale, Specifics   51 51 [id number] Therapeutic Exercise (timed):  increase ROM, strength, coordination, balance, and proprioception to improve patient's ability to progress to PLOF and address remaining functional goals. (see flow sheet as applicable)   51 51    Total Total   [x] Patient Education billed concurrently with other procedures     Modalities Rationale:     decrease pain, increase tissue extensibility, and increase muscle contraction/control to improve patient's ability to progress to PLOF and address remaining functional goals.    15   min [x] Estim Unattended,             []

## 2024-09-04 ENCOUNTER — HOSPITAL ENCOUNTER (OUTPATIENT)
Facility: HOSPITAL | Age: 54
Setting detail: RECURRING SERIES
Discharge: HOME | End: 2024-09-07
Payer: MEDICAID

## 2024-09-04 PROCEDURE — 97110 THERAPEUTIC EXERCISES: CPT

## 2024-09-04 PROCEDURE — G0283 ELEC STIM OTHER THAN WOUND: HCPCS

## 2024-09-04 NOTE — PROGRESS NOTES
[]w/US   []w/ice   [x]w/heat  Position: seated   Location: R low back             min []  Mechanical Traction,        []  Cervical      []  Lumbar        []  Prone         []  Supine           []  Intermitt.     []  Cont.     Lbs:    pounds  [] With heat  [] Simultaneously performed with E-Stim    min []  Ultrasound,    []Continuous   [] Pulsed  []1MHz   []3MHz Location:  W/cm2:    min  Unbilled []  Ice     []  Heat             Position:  Location:            min []  Vasopneumatic Device,      pre-treatment girth :  cm   post-treatment girth : cm   measured at (landmark       location) :  Pressure:       [] lo [] med [] hi   Temperature: [] lo [] med [] Hi    [] With ice    [] Simultaneously performed with Estim     Skin assessment post-treatment (if applicable):    [x]  intact    [x]  redness- no adverse reaction []redness - adverse reaction:        Pain Level at end of session (0-10 scale): 4/10    Assessment   Patient tolerated treatment fair today. He did have some increased pain at the end of the session rubio after the higher level core activation tasks. He was encouraged to monitor tasks and exercises at home for specific aggrevators so we can tailor session around this. He does respond well to heat and estim at the end of the session. Patient will continue to benefit from skilled PT services to modify and progress therapeutic interventions, analyze and address functional mobility deficits, analyze and address ROM deficits, analyze and address strength deficits, analyze and address soft tissue restrictions, and analyze and cue for proper movement patterns to address functional deficits and attain remaining goals.      PRECAUTIONS:  None at this time        Date of Initial Evaluation: 8/15/2024  Date of last Progress Note: n/a  Visit # of last Progress Note: 1      Number of Insurance Authorized visits: Not Applicable         Therapeutic Exercise Flow Sheet:

## 2024-09-06 ENCOUNTER — HOSPITAL ENCOUNTER (OUTPATIENT)
Facility: HOSPITAL | Age: 54
Setting detail: RECURRING SERIES
Discharge: HOME | End: 2024-09-09
Payer: MEDICAID

## 2024-09-06 PROCEDURE — G0283 ELEC STIM OTHER THAN WOUND: HCPCS

## 2024-09-06 PROCEDURE — 97110 THERAPEUTIC EXERCISES: CPT

## 2024-09-12 ENCOUNTER — HOSPITAL ENCOUNTER (OUTPATIENT)
Facility: HOSPITAL | Age: 54
Setting detail: RECURRING SERIES
Discharge: HOME | End: 2024-09-15
Payer: MEDICAID

## 2024-09-12 PROCEDURE — G0283 ELEC STIM OTHER THAN WOUND: HCPCS

## 2024-09-12 PROCEDURE — 97110 THERAPEUTIC EXERCISES: CPT

## 2024-09-13 ENCOUNTER — HOSPITAL ENCOUNTER (OUTPATIENT)
Facility: HOSPITAL | Age: 54
Setting detail: RECURRING SERIES
Discharge: HOME | End: 2024-09-16
Payer: MEDICAID

## 2024-09-13 PROCEDURE — 97016 VASOPNEUMATIC DEVICE THERAPY: CPT

## 2024-09-13 PROCEDURE — 97110 THERAPEUTIC EXERCISES: CPT

## 2024-09-13 PROCEDURE — G0283 ELEC STIM OTHER THAN WOUND: HCPCS

## 2024-09-16 ENCOUNTER — APPOINTMENT (OUTPATIENT)
Facility: HOSPITAL | Age: 54
End: 2024-09-16
Payer: MEDICAID

## 2024-09-18 ENCOUNTER — HOSPITAL ENCOUNTER (OUTPATIENT)
Facility: HOSPITAL | Age: 54
Setting detail: RECURRING SERIES
Discharge: HOME | End: 2024-09-21
Payer: MEDICAID

## 2024-09-18 PROCEDURE — G0283 ELEC STIM OTHER THAN WOUND: HCPCS

## 2024-09-18 PROCEDURE — 97110 THERAPEUTIC EXERCISES: CPT

## 2024-09-23 ENCOUNTER — HOSPITAL ENCOUNTER (OUTPATIENT)
Facility: HOSPITAL | Age: 54
Setting detail: RECURRING SERIES
Discharge: HOME | End: 2024-09-26
Payer: MEDICAID

## 2024-09-23 PROCEDURE — 97110 THERAPEUTIC EXERCISES: CPT

## 2024-10-08 ENCOUNTER — OFFICE VISIT (OUTPATIENT)
Age: 54
End: 2024-10-08
Payer: MEDICAID

## 2024-10-08 VITALS
HEART RATE: 78 BPM | WEIGHT: 187 LBS | SYSTOLIC BLOOD PRESSURE: 124 MMHG | HEIGHT: 66 IN | OXYGEN SATURATION: 98 % | DIASTOLIC BLOOD PRESSURE: 62 MMHG | RESPIRATION RATE: 16 BRPM | BODY MASS INDEX: 30.05 KG/M2

## 2024-10-08 DIAGNOSIS — J34.2 NASAL SEPTAL DEVIATION: ICD-10-CM

## 2024-10-08 DIAGNOSIS — R09.81 NASAL CONGESTION: Primary | ICD-10-CM

## 2024-10-08 DIAGNOSIS — J34.3 NASAL TURBINATE HYPERTROPHY: ICD-10-CM

## 2024-10-08 DIAGNOSIS — J30.9 ALLERGIC RHINITIS, UNSPECIFIED SEASONALITY, UNSPECIFIED TRIGGER: ICD-10-CM

## 2024-10-08 PROCEDURE — 99204 OFFICE O/P NEW MOD 45 MIN: CPT | Performed by: OTOLARYNGOLOGY

## 2024-10-08 RX ORDER — ARIPIPRAZOLE 5 MG/1
5 TABLET ORAL NIGHTLY
COMMUNITY
Start: 2024-10-01

## 2024-10-08 RX ORDER — BACLOFEN 10 MG/1
TABLET ORAL
COMMUNITY
Start: 2024-07-29

## 2024-10-08 RX ORDER — LORATADINE 10 MG/1
10 TABLET ORAL DAILY
Qty: 90 TABLET | Refills: 1 | Status: SHIPPED | OUTPATIENT
Start: 2024-10-08

## 2024-10-08 RX ORDER — ARIPIPRAZOLE 300 MG
KIT INTRAMUSCULAR
COMMUNITY
Start: 2024-10-02

## 2024-10-08 RX ORDER — FLUTICASONE PROPIONATE 50 MCG
1 SPRAY, SUSPENSION (ML) NASAL 2 TIMES DAILY
Qty: 16 G | Refills: 2 | Status: SHIPPED | OUTPATIENT
Start: 2024-10-08

## 2024-10-08 RX ORDER — ESCITALOPRAM OXALATE 20 MG/1
TABLET ORAL
COMMUNITY
Start: 2024-10-01

## 2024-10-08 RX ORDER — RISPERIDONE 2 MG/1
TABLET ORAL
COMMUNITY
Start: 2024-08-01

## 2024-10-08 RX ORDER — MELOXICAM 15 MG/1
TABLET ORAL
COMMUNITY
Start: 2024-07-29

## 2024-10-08 NOTE — PROGRESS NOTES
Otolaryngology-Head and Neck Surgery  New Patient Visit     Patient: Bassam Washington  YOB: 1970  MRN: [id number]  Date of Service: 10/8/2024    Chief Complaint:   Chief Complaint   Patient presents with    New Patient     Swollen adenoids         History of Present Illness: Bassam Washington is a 53 y.o. male who presents today for discussion of chronic nasal congestion.    Has a history of chronic nasal congestion, ongoing for years.  Does suspect a component of allergies.  Was living in [place] but after relocating to [place] has noticed worsening allergy symptoms.    Has tried Claritin and Benadryl which do provide some relief  Was recommended to use nasal sprays at some point but was also told given history of smoking this would not likely be helpful.  He has worked towards smoking cessation and now vapes.    No prior nasal surgeries    Sometime ago experienced worsening congestion when leaning forward to pray which was worrisome, prompting him to seek further evaluation      Past Medical History:  Past Medical History:   Diagnosis Date    Dizziness 8/17/2020    Sleep apnea     Tobacco dependence 4/5/2021       Past Surgical History:   Past Surgical History:   Procedure Laterality Date    COLONOSCOPY N/A 6/14/2023    COLONOSCOPY WITH BIOPSY performed by Waldemar Haile Jr., MD at [facility] ENDOSCOPY    WISDOM TOOTH EXTRACTION         Medications:   Current Outpatient Medications   Medication Instructions    cetirizine (ZYRTEC) 10 mg, Oral, DAILY    clotrimazole (MYCELEX) 10 mg, Oral, 5 TIMES DAILY    fexofenadine (ALLEGRA ALLERGY) 60 mg, Oral, DAILY    fluticasone (FLONASE) 50 MCG/ACT nasal spray 2 sprays, Nasal, DAILY    ondansetron (ZOFRAN-ODT) 4 mg, Oral, EVERY 8 HOURS PRN       Allergies:   Allergies   Allergen Reactions    Seasonal Hives       Social History:   Social History     Tobacco Use    Smoking status: Former     Current packs/day: 0.00     Types: Cigarettes     Quit date:

## 2024-12-10 ENCOUNTER — OFFICE VISIT (OUTPATIENT)
Age: 54
End: 2024-12-10
Payer: MEDICAID

## 2024-12-10 VITALS
DIASTOLIC BLOOD PRESSURE: 76 MMHG | BODY MASS INDEX: 31.53 KG/M2 | HEART RATE: 97 BPM | OXYGEN SATURATION: 98 % | SYSTOLIC BLOOD PRESSURE: 108 MMHG | WEIGHT: 196.2 LBS | RESPIRATION RATE: 16 BRPM | HEIGHT: 66 IN

## 2024-12-10 DIAGNOSIS — R09.81 NASAL CONGESTION: Primary | ICD-10-CM

## 2024-12-10 DIAGNOSIS — J34.2 NASAL SEPTAL DEVIATION: ICD-10-CM

## 2024-12-10 DIAGNOSIS — J34.3 NASAL TURBINATE HYPERTROPHY: ICD-10-CM

## 2024-12-10 PROCEDURE — 99213 OFFICE O/P EST LOW 20 MIN: CPT | Performed by: OTOLARYNGOLOGY

## 2024-12-10 NOTE — PROGRESS NOTES
times daily    loratadine (CLARITIN) 10 mg, Oral, DAILY    meloxicam (MOBIC) 15 MG tablet TAKE 1 TABLET BY MOUTH ONCE DAILY AS NEEDED FOR PAIN    risperiDONE (RISPERDAL) 2 MG tablet TAKE 1 TABLET BY MOUTH EVERY DAY AT BEDTIME FOR PARANOIA. DO NOT DRIVE AFTER TAKING THIS MEDICATION       Allergies:   Allergies   Allergen Reactions    Seasonal Hives       Social History:   Social History     Tobacco Use    Smoking status: Former     Current packs/day: 0.00     Types: Cigarettes     Quit date:      Years since quittin.9    Smokeless tobacco: Never   Vaping Use    Vaping status: Every Day    Substances: Nicotine, Flavoring    Devices: Disposable   Substance Use Topics    Alcohol use: Not Currently    Drug use: Yes     Types: Marijuana (Weed)     Comment: a year or more        Family History:  Family History   Problem Relation Age of Onset    Heart Disease Mother     Cancer Father        Review of Systems:    Consitutional: denies fever, excessive weight gain or loss.  Eyes: denies diplopia, eye pain.  Integumentary: denies new concerning skin lesions.  Ears, Nose, Mouth, Throat: denies except as per HPI.  Endocrine: denies hot or cold intolerance, increased thirst.  Respiratory: denies cough, hemoptysis, wheezing  Gastrointestinal: denies trouble swallowing, nausea, emesis, regurgitation  Musculoskeletal: denies muscle weakness or wasting  Cardiovascular: denies chest pain, shortness of breath  Neurologic: denies seizures, numbness or tingling, syncope  Hematologic: denies easy bleeding or bruising    Physical Examination  Ht 1.664 m (5' 5.51\")   BMI 30.64 kg/m²     General: Comfortable, pleasant, appears stated age  Voice: Strong, speaking in full sentences, no stridor    Face: No masses or lesions, facial strength symmetric   Ears: External ears unremarkable. Bilateral ear canal clear. Tympanic membrane clear and intact, with visible landmarks. Clear middle ear space  Nose: External nose unremarkable. Dorsum

## 2025-01-20 DIAGNOSIS — J30.9 ALLERGIC RHINITIS, UNSPECIFIED SEASONALITY, UNSPECIFIED TRIGGER: ICD-10-CM

## 2025-01-20 DIAGNOSIS — R09.81 NASAL CONGESTION: ICD-10-CM
